# Patient Record
Sex: MALE | Employment: FULL TIME | URBAN - METROPOLITAN AREA
[De-identification: names, ages, dates, MRNs, and addresses within clinical notes are randomized per-mention and may not be internally consistent; named-entity substitution may affect disease eponyms.]

---

## 2017-03-03 ENCOUNTER — APPOINTMENT (OUTPATIENT)
Dept: CT IMAGING | Facility: HOSPITAL | Age: 22
End: 2017-03-03
Attending: EMERGENCY MEDICINE
Payer: COMMERCIAL

## 2017-03-03 ENCOUNTER — HOSPITAL ENCOUNTER (EMERGENCY)
Facility: HOSPITAL | Age: 22
Discharge: HOME | End: 2017-03-04
Attending: EMERGENCY MEDICINE
Payer: COMMERCIAL

## 2017-03-03 DIAGNOSIS — D72.820 LYMPHOCYTOSIS: Primary | ICD-10-CM

## 2017-03-03 LAB
ALBUMIN SERPL-MCNC: 3.9 G/DL (ref 3.5–4.8)
ALP LIVER SERPL-CCNC: 114 U/L (ref 45–117)
ALT SERPL-CCNC: 144 U/L (ref 17–63)
APTT PPP: 38.1 SECONDS (ref 25–34)
AST SERPL-CCNC: 81 U/L (ref 15–41)
BASOPHILS # BLD AUTO: 0.21 X10(3) UL (ref 0–0.1)
BASOPHILS NFR BLD AUTO: 0.6 %
BILIRUB SERPL-MCNC: 0.3 MG/DL (ref 0.1–2)
BUN BLD-MCNC: 19 MG/DL (ref 8–20)
CALCIUM BLD-MCNC: 9 MG/DL (ref 8.3–10.3)
CHLORIDE: 107 MMOL/L (ref 101–111)
CO2: 28 MMOL/L (ref 22–32)
CREAT BLD-MCNC: 1.03 MG/DL (ref 0.7–1.3)
EOSINOPHIL # BLD AUTO: 0.05 X10(3) UL (ref 0–0.3)
EOSINOPHIL NFR BLD AUTO: 0.1 %
ERYTHROCYTE [DISTWIDTH] IN BLOOD BY AUTOMATED COUNT: 13.4 % (ref 11.5–16)
GLUCOSE BLD-MCNC: 99 MG/DL (ref 70–99)
HCT VFR BLD AUTO: 46.5 % (ref 37–53)
HGB BLD-MCNC: 15.6 G/DL (ref 13–17)
IMMATURE GRANULOCYTE COUNT: 0.21 X10(3) UL (ref 0–1)
IMMATURE GRANULOCYTE RATIO %: 0.6 %
INR BLD: 1.16 (ref 0.89–1.11)
LYMPHOCYTES # BLD AUTO: 31.88 X10(3) UL (ref 0.9–4)
LYMPHOCYTES NFR BLD AUTO: 89 %
M PROTEIN MFR SERPL ELPH: 7.8 G/DL (ref 6.1–8.3)
MCH RBC QN AUTO: 29.1 PG (ref 27–33.2)
MCHC RBC AUTO-ENTMCNC: 33.5 G/DL (ref 31–37)
MCV RBC AUTO: 86.6 FL (ref 80–99)
MONOCYTES # BLD AUTO: 1.55 X10(3) UL (ref 0.1–0.6)
MONOCYTES NFR BLD AUTO: 4.3 %
MONOSCREEN: NEGATIVE
MORPHOLOGY: NORMAL
NEUTROPHIL ABS PRELIM: 1.93 X10 (3) UL (ref 1.3–6.7)
NEUTROPHILS # BLD AUTO: 1.93 X10(3) UL (ref 1.3–6.7)
NEUTROPHILS NFR BLD AUTO: 5.4 %
PLATELET # BLD AUTO: 221 10(3)UL (ref 150–450)
PLATELET MORPHOLOGY: NORMAL
POTASSIUM SERPL-SCNC: 4.4 MMOL/L (ref 3.6–5.1)
PSA SERPL DL<=0.01 NG/ML-MCNC: 14.9 SECONDS (ref 12–14.3)
RBC # BLD AUTO: 5.37 X10(6)UL (ref 4.3–5.7)
RED CELL DISTRIBUTION WIDTH-SD: 42.2 FL (ref 35.1–46.3)
SODIUM SERPL-SCNC: 142 MMOL/L (ref 136–144)
TSI SER-ACNC: 3.56 MIU/ML (ref 0.35–5.5)
WBC # BLD AUTO: 35.8 X10(3) UL (ref 4–13)

## 2017-03-03 PROCEDURE — 36415 COLL VENOUS BLD VENIPUNCTURE: CPT

## 2017-03-03 PROCEDURE — 85025 COMPLETE CBC W/AUTO DIFF WBC: CPT | Performed by: EMERGENCY MEDICINE

## 2017-03-03 PROCEDURE — 70450 CT HEAD/BRAIN W/O DYE: CPT

## 2017-03-03 PROCEDURE — 85610 PROTHROMBIN TIME: CPT | Performed by: EMERGENCY MEDICINE

## 2017-03-03 PROCEDURE — 87040 BLOOD CULTURE FOR BACTERIA: CPT | Performed by: EMERGENCY MEDICINE

## 2017-03-03 PROCEDURE — 80053 COMPREHEN METABOLIC PANEL: CPT | Performed by: EMERGENCY MEDICINE

## 2017-03-03 PROCEDURE — 99285 EMERGENCY DEPT VISIT HI MDM: CPT

## 2017-03-03 PROCEDURE — 85730 THROMBOPLASTIN TIME PARTIAL: CPT | Performed by: EMERGENCY MEDICINE

## 2017-03-03 PROCEDURE — 86403 PARTICLE AGGLUT ANTBDY SCRN: CPT | Performed by: EMERGENCY MEDICINE

## 2017-03-03 PROCEDURE — 93005 ELECTROCARDIOGRAM TRACING: CPT

## 2017-03-03 PROCEDURE — 93010 ELECTROCARDIOGRAM REPORT: CPT

## 2017-03-03 PROCEDURE — 84443 ASSAY THYROID STIM HORMONE: CPT | Performed by: EMERGENCY MEDICINE

## 2017-03-04 VITALS
HEART RATE: 86 BPM | HEIGHT: 67 IN | DIASTOLIC BLOOD PRESSURE: 59 MMHG | TEMPERATURE: 99 F | WEIGHT: 125 LBS | SYSTOLIC BLOOD PRESSURE: 106 MMHG | BODY MASS INDEX: 19.62 KG/M2 | RESPIRATION RATE: 21 BRPM | OXYGEN SATURATION: 97 %

## 2017-03-04 LAB
ATRIAL RATE: 78 BPM
P AXIS: -15 DEGREES
P-R INTERVAL: 130 MS
Q-T INTERVAL: 346 MS
QRS DURATION: 76 MS
QTC CALCULATION (BEZET): 394 MS
R AXIS: 83 DEGREES
T AXIS: 15 DEGREES
VENTRICULAR RATE: 78 BPM

## 2017-03-04 NOTE — ED INITIAL ASSESSMENT (HPI)
Migraine about a week ago, swollen eyes starting on Tuesday, lymph node swelling starting today. He has been more tired lately and has had a loss of appetite.

## 2017-03-04 NOTE — ED PROVIDER NOTES
Patient Seen in: [facility] Emergency Department    History   Patient presents with:  Abnormal Result (metabolic, cardiac)    Stated Complaint: sore thoat abnormal ekg     HPI    The patient is a 21-year-old previously healthy male other than childh above.    PSFH elements reviewed from today and agreed except as otherwise stated in HPI.     Physical Exam       ED Triage Vitals   BP 03/03/17 2217 128/85 mmHg   Pulse 03/03/17 2217 97   Resp 03/03/17 2217 18   Temp 03/03/17 2217 99.3 °F (37.4 °C)   Temp scrotum. No epididymal tenderness. Extremities: No deformity, nontender throughout, and normal active range of motion of all 4 extremities. Distal pulses normal and symmetric  Skin: No masses or nodules or abnormalities.   Psych: Normal interaction, coop EKG    Rate, intervals and axes as noted on EKG Report. Rate: 78  Rhythm: Sinus Rhythm  Reading: Normal sinus rhythm with sinus arrhythmia, no other ectopy, normal axis, normal intervals, no ST segment changes or T-wave inversions. This is a normal EKG. Dictated by: Nicole Glass MD on 3/03/2017 at 23:31     Approved by: Nicole Glass MD                The patient does have a WBC of almost 36, predominantly lymphocytes. He does have significant lymphadenopathy. Otherwise his exam is very benign.

## 2017-03-08 ENCOUNTER — OFFICE VISIT (OUTPATIENT)
Dept: FAMILY MEDICINE CLINIC | Facility: CLINIC | Age: 22
End: 2017-03-08

## 2017-03-08 VITALS
RESPIRATION RATE: 12 BRPM | WEIGHT: 126.81 LBS | DIASTOLIC BLOOD PRESSURE: 62 MMHG | BODY MASS INDEX: 20.87 KG/M2 | HEIGHT: 65.5 IN | HEART RATE: 72 BPM | TEMPERATURE: 98 F | SYSTOLIC BLOOD PRESSURE: 104 MMHG

## 2017-03-08 DIAGNOSIS — D72.820 LYMPHOCYTOSIS: Primary | ICD-10-CM

## 2017-03-08 DIAGNOSIS — R59.0 LYMPHADENOPATHY, POSTERIOR CERVICAL: ICD-10-CM

## 2017-03-08 PROCEDURE — 99204 OFFICE O/P NEW MOD 45 MIN: CPT | Performed by: PHYSICIAN ASSISTANT

## 2017-03-08 NOTE — PROGRESS NOTES
CC:  Patient presents with:  Establish Care: New patient   ER F/U      HPI: Ignacio presents for an ER F/U. He was seen 5 days ago in the IC for fatigue and left posterior cervical lymphadenopathy. Inguinal lymph node enlargement was found on exam as well.  C reviewed.     Constitutional: Vital signs reviewed as noted; well developed, well nourished; in no acute distress  HENT:  Head: Normocephalic, atraumatic   Ears: Canals patent and free from edema, erythema or otorrhea; TMs clear; landmarks visible   Nose: N from the treatments as a result of today. Reviewed Past Medical History and   There is no problem list on file for this patient.         Orders Placed This Encounter  CBC W Differential W Platelet [E]  Standing Status: Future  Standing Expiration Date: 3

## 2017-03-08 NOTE — PATIENT INSTRUCTIONS
Lymphadenopathy  Lymphadenopathy is swelling of the lymph nodes. Lymph nodes are small, bean-shaped glands around the body. What are lymph nodes? Lymph nodes are part of your immune system.  The glands are found in your neck, armpits, groin, chest, and You may also have symptoms from an infection causing the swollen glands. These symptoms may include fever, sore throat, body aches, or cough. Diagnosing lymphadenopathy  Your health care provider will ask about your health history and symptoms.  He or she © 6571-4249 [address], [address] [place]. All rights reserved. This information is not intended as a substitute for professional medical care. Always follow your healthcare professional's instructions.

## 2017-03-10 ENCOUNTER — LAB ENCOUNTER (OUTPATIENT)
Dept: LAB | Age: 22
End: 2017-03-10
Attending: FAMILY MEDICINE
Payer: COMMERCIAL

## 2017-03-10 DIAGNOSIS — D72.820 ELEVATED LYMPHOCYTE COUNT: ICD-10-CM

## 2017-03-10 DIAGNOSIS — D72.829 LEUKOCYTOSIS, UNSPECIFIED TYPE: Primary | ICD-10-CM

## 2017-03-10 DIAGNOSIS — R59.0 LYMPHADENOPATHY, POSTERIOR CERVICAL: ICD-10-CM

## 2017-03-10 DIAGNOSIS — D72.820 LYMPHOCYTOSIS: ICD-10-CM

## 2017-03-10 LAB
BASOPHILS # BLD AUTO: 0.08 X10(3) UL (ref 0–0.1)
BASOPHILS NFR BLD AUTO: 0.6 %
EOSINOPHIL # BLD AUTO: 0.01 X10(3) UL (ref 0–0.3)
EOSINOPHIL NFR BLD AUTO: 0.1 %
ERYTHROCYTE [DISTWIDTH] IN BLOOD BY AUTOMATED COUNT: 13.5 % (ref 11.5–16)
HCT VFR BLD AUTO: 44.7 % (ref 37–53)
HGB BLD-MCNC: 14.3 G/DL (ref 13–17)
IMMATURE GRANULOCYTE COUNT: 0.03 X10(3) UL (ref 0–1)
IMMATURE GRANULOCYTE RATIO %: 0.2 %
LYMPHOCYTES # BLD AUTO: 11.48 X10(3) UL (ref 0.9–4)
LYMPHOCYTES NFR BLD AUTO: 80.4 %
MCH RBC QN AUTO: 28.2 PG (ref 27–33.2)
MCHC RBC AUTO-ENTMCNC: 32 G/DL (ref 31–37)
MCV RBC AUTO: 88.2 FL (ref 80–99)
MONOCYTES # BLD AUTO: 0.56 X10(3) UL (ref 0.1–0.6)
MONOCYTES NFR BLD AUTO: 3.9 %
NEUTROPHIL ABS PRELIM: 2.11 X10 (3) UL (ref 1.3–6.7)
NEUTROPHILS # BLD AUTO: 2.11 X10(3) UL (ref 1.3–6.7)
NEUTROPHILS NFR BLD AUTO: 14.8 %
PLATELET # BLD AUTO: 154 10(3)UL (ref 150–450)
RBC # BLD AUTO: 5.07 X10(6)UL (ref 4.3–5.7)
RED CELL DISTRIBUTION WIDTH-SD: 43.2 FL (ref 35.1–46.3)
WBC # BLD AUTO: 14.3 X10(3) UL (ref 4–13)

## 2017-03-10 PROCEDURE — 85025 COMPLETE CBC W/AUTO DIFF WBC: CPT

## 2017-03-10 PROCEDURE — 36415 COLL VENOUS BLD VENIPUNCTURE: CPT

## 2017-03-18 ENCOUNTER — LAB ENCOUNTER (OUTPATIENT)
Dept: LAB | Age: 22
End: 2017-03-18
Attending: FAMILY MEDICINE
Payer: COMMERCIAL

## 2017-03-18 DIAGNOSIS — D72.829 LEUKOCYTOSIS, UNSPECIFIED TYPE: ICD-10-CM

## 2017-03-18 DIAGNOSIS — D72.820 ELEVATED LYMPHOCYTE COUNT: ICD-10-CM

## 2017-03-18 LAB
BASOPHILS # BLD AUTO: 0.03 X10(3) UL (ref 0–0.1)
BASOPHILS NFR BLD AUTO: 0.4 %
EOSINOPHIL # BLD AUTO: 0.14 X10(3) UL (ref 0–0.3)
EOSINOPHIL NFR BLD AUTO: 1.7 %
ERYTHROCYTE [DISTWIDTH] IN BLOOD BY AUTOMATED COUNT: 13.2 % (ref 11.5–16)
HCT VFR BLD AUTO: 45.3 % (ref 37–53)
HGB BLD-MCNC: 14.4 G/DL (ref 13–17)
IMMATURE GRANULOCYTE COUNT: 0.01 X10(3) UL (ref 0–1)
IMMATURE GRANULOCYTE RATIO %: 0.1 %
LYMPHOCYTES # BLD AUTO: 6.31 X10(3) UL (ref 0.9–4)
LYMPHOCYTES NFR BLD AUTO: 76.8 %
MCH RBC QN AUTO: 28.1 PG (ref 27–33.2)
MCHC RBC AUTO-ENTMCNC: 31.8 G/DL (ref 31–37)
MCV RBC AUTO: 88.5 FL (ref 80–99)
MONOCYTES # BLD AUTO: 0.64 X10(3) UL (ref 0.1–0.6)
MONOCYTES NFR BLD AUTO: 7.8 %
NEUTROPHIL ABS PRELIM: 1.09 X10 (3) UL (ref 1.3–6.7)
NEUTROPHILS # BLD AUTO: 1.09 X10(3) UL (ref 1.3–6.7)
NEUTROPHILS NFR BLD AUTO: 13.2 %
PLATELET # BLD AUTO: 192 10(3)UL (ref 150–450)
RBC # BLD AUTO: 5.12 X10(6)UL (ref 4.3–5.7)
RED CELL DISTRIBUTION WIDTH-SD: 43 FL (ref 35.1–46.3)
WBC # BLD AUTO: 8.2 X10(3) UL (ref 4–13)

## 2017-03-18 PROCEDURE — 36415 COLL VENOUS BLD VENIPUNCTURE: CPT

## 2017-03-18 PROCEDURE — 85025 COMPLETE CBC W/AUTO DIFF WBC: CPT

## 2017-03-20 ENCOUNTER — TELEPHONE (OUTPATIENT)
Dept: FAMILY MEDICINE CLINIC | Facility: CLINIC | Age: 22
End: 2017-03-20

## 2017-03-20 DIAGNOSIS — R59.1 LYMPHADENOPATHY: ICD-10-CM

## 2017-03-20 DIAGNOSIS — D72.820 LYMPHOCYTOSIS: Primary | ICD-10-CM

## 2017-05-01 ENCOUNTER — LAB ENCOUNTER (OUTPATIENT)
Dept: LAB | Age: 22
End: 2017-05-01
Attending: FAMILY MEDICINE
Payer: COMMERCIAL

## 2017-05-01 DIAGNOSIS — R59.1 LYMPHADENOPATHY: ICD-10-CM

## 2017-05-01 DIAGNOSIS — D72.820 LYMPHOCYTOSIS: ICD-10-CM

## 2017-05-01 PROCEDURE — 36415 COLL VENOUS BLD VENIPUNCTURE: CPT | Performed by: PHYSICIAN ASSISTANT

## 2017-05-01 PROCEDURE — 85025 COMPLETE CBC W/AUTO DIFF WBC: CPT | Performed by: PHYSICIAN ASSISTANT

## 2017-05-08 ENCOUNTER — APPOINTMENT (OUTPATIENT)
Dept: LAB | Age: 22
End: 2017-05-08
Attending: FAMILY MEDICINE
Payer: COMMERCIAL

## 2017-05-08 ENCOUNTER — OFFICE VISIT (OUTPATIENT)
Dept: FAMILY MEDICINE CLINIC | Facility: CLINIC | Age: 22
End: 2017-05-08

## 2017-05-08 VITALS
SYSTOLIC BLOOD PRESSURE: 118 MMHG | BODY MASS INDEX: 21.73 KG/M2 | WEIGHT: 132 LBS | TEMPERATURE: 98 F | RESPIRATION RATE: 16 BRPM | HEIGHT: 65.5 IN | HEART RATE: 84 BPM | DIASTOLIC BLOOD PRESSURE: 68 MMHG

## 2017-05-08 DIAGNOSIS — R74.8 ELEVATED LIVER ENZYMES: ICD-10-CM

## 2017-05-08 DIAGNOSIS — Z00.00 ROUTINE GENERAL MEDICAL EXAMINATION AT A HEALTH CARE FACILITY: Primary | ICD-10-CM

## 2017-05-08 DIAGNOSIS — R59.0 ENLARGED LYMPH NODE IN NECK: ICD-10-CM

## 2017-05-08 PROCEDURE — 99395 PREV VISIT EST AGE 18-39: CPT | Performed by: FAMILY MEDICINE

## 2017-05-08 PROCEDURE — 80076 HEPATIC FUNCTION PANEL: CPT | Performed by: FAMILY MEDICINE

## 2017-05-08 PROCEDURE — 36415 COLL VENOUS BLD VENIPUNCTURE: CPT | Performed by: FAMILY MEDICINE

## 2017-05-08 NOTE — PROGRESS NOTES
Eder Diaz is a 22year old male who presents for a complete physical exam.   HPI:   Asthma as a child, not now. Zyrtec prn. 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 About 2 months ago, sister noticed lump on back on neck.   Went to ER - dx with wolfgang bruits  LUNGS: clear to auscultation  CARDIO: RRR without murmur  GI: good BS's,no masses, HSM or tenderness  : /  EXTREMITIES: no cyanosis, clubbing or edema  NEURO: Oriented times three,cranial nerves are intact,motor and sensory are grossly intact  LY

## 2019-09-25 ENCOUNTER — TELEPHONE (OUTPATIENT)
Dept: FAMILY MEDICINE CLINIC | Facility: CLINIC | Age: 24
End: 2019-09-25

## 2019-09-25 DIAGNOSIS — Z00.00 ROUTINE GENERAL MEDICAL EXAMINATION AT A HEALTH CARE FACILITY: Primary | ICD-10-CM

## 2019-09-25 NOTE — TELEPHONE ENCOUNTER
Please enter lab orders for the patient's upcoming physical appointment. Physical scheduled:    Your appointments     Date & Time Appointment Department [facility])    Oct 26, 2019  9:00 AM CDT Physical - Established Patient with MD Alon Siu

## 2019-10-26 ENCOUNTER — OFFICE VISIT (OUTPATIENT)
Dept: FAMILY MEDICINE CLINIC | Facility: CLINIC | Age: 24
End: 2019-10-26
Payer: COMMERCIAL

## 2019-10-26 VITALS
BODY MASS INDEX: 21.34 KG/M2 | SYSTOLIC BLOOD PRESSURE: 106 MMHG | TEMPERATURE: 99 F | HEIGHT: 65.75 IN | HEART RATE: 72 BPM | WEIGHT: 131.19 LBS | DIASTOLIC BLOOD PRESSURE: 66 MMHG | RESPIRATION RATE: 17 BRPM

## 2019-10-26 DIAGNOSIS — Z00.00 ROUTINE GENERAL MEDICAL EXAMINATION AT A HEALTH CARE FACILITY: Primary | ICD-10-CM

## 2019-10-26 PROCEDURE — 99395 PREV VISIT EST AGE 18-39: CPT | Performed by: FAMILY MEDICINE

## 2019-10-26 PROCEDURE — 90471 IMMUNIZATION ADMIN: CPT | Performed by: FAMILY MEDICINE

## 2019-10-26 PROCEDURE — 90686 IIV4 VACC NO PRSV 0.5 ML IM: CPT | Performed by: FAMILY MEDICINE

## 2019-10-26 NOTE — PROGRESS NOTES
Yamile Castrejon is a 22year old male who presents for a complete physical exam.   HPI:   Healthy, no concerns today. [employer] in [facility]. Living in [facility]. fhx unremarkable.     Last colonoscopy:  n/a  Last PSA:  n/a    Wt Readings from Last 6 edema  NEURO: Oriented times three,cranial nerves are intact,motor and sensory are grossly intact    ASSESSMENT AND PLAN:   Remy Ceja is a 22year old male who presents for a complete physical exam.  Routine general medical examination at a [facility]

## 2020-10-13 ENCOUNTER — OFFICE VISIT (OUTPATIENT)
Dept: FAMILY MEDICINE CLINIC | Facility: CLINIC | Age: 25
End: 2020-10-13
Payer: COMMERCIAL

## 2020-10-13 VITALS
HEIGHT: 65.5 IN | WEIGHT: 132.63 LBS | TEMPERATURE: 98 F | DIASTOLIC BLOOD PRESSURE: 64 MMHG | SYSTOLIC BLOOD PRESSURE: 110 MMHG | BODY MASS INDEX: 21.83 KG/M2 | RESPIRATION RATE: 16 BRPM | HEART RATE: 64 BPM

## 2020-10-13 DIAGNOSIS — Z00.00 ROUTINE MEDICAL EXAM: Primary | ICD-10-CM

## 2020-10-13 DIAGNOSIS — Z00.00 LABORATORY EXAMINATION ORDERED AS PART OF A COMPLETE PHYSICAL EXAMINATION: ICD-10-CM

## 2020-10-13 DIAGNOSIS — Z23 NEEDS FLU SHOT: ICD-10-CM

## 2020-10-13 PROCEDURE — 3008F BODY MASS INDEX DOCD: CPT | Performed by: PHYSICIAN ASSISTANT

## 2020-10-13 PROCEDURE — 3074F SYST BP LT 130 MM HG: CPT | Performed by: PHYSICIAN ASSISTANT

## 2020-10-13 PROCEDURE — 99395 PREV VISIT EST AGE 18-39: CPT | Performed by: PHYSICIAN ASSISTANT

## 2020-10-13 PROCEDURE — 3078F DIAST BP <80 MM HG: CPT | Performed by: PHYSICIAN ASSISTANT

## 2020-10-13 PROCEDURE — 90686 IIV4 VACC NO PRSV 0.5 ML IM: CPT | Performed by: PHYSICIAN ASSISTANT

## 2020-10-13 PROCEDURE — 90471 IMMUNIZATION ADMIN: CPT | Performed by: PHYSICIAN ASSISTANT

## 2020-10-15 NOTE — PROGRESS NOTES
DATE OF EXAM  10/13/2020    CHIEF COMPLAINT/REASON FOR VISIT  Annual exam.    HISTORY OF PRESENT ILLNESS  Igancio Valencia presents today for routine annual physical examination. He has been doing well. Requests flu shot. No concerns today.        No current stools or blood in the stool. : No increased frequency or urgency. No hematuria. PSYCH: No anxiety or depression. No suicidal ideation. SKIN: No rashes or lesions. NEURO: Denies numbness or tingling of extremities. No headaches. No recent falls. as shown above. Recommend annual physical exams. 2. Laboratory examination ordered as part of a complete physical examination  - LIPID PANEL; Future  - COMP METABOLIC PANEL (14); Future  - TSH W REFLEX TO FREE T4; Future    3.  Needs flu shot  Flu shot g

## 2021-10-18 ENCOUNTER — TELEPHONE (OUTPATIENT)
Dept: FAMILY MEDICINE CLINIC | Facility: CLINIC | Age: 26
End: 2021-10-18

## 2021-10-18 DIAGNOSIS — Z00.00 LABORATORY EXAMINATION ORDERED AS PART OF A COMPLETE PHYSICAL EXAMINATION: Primary | ICD-10-CM

## 2021-10-18 NOTE — TELEPHONE ENCOUNTER
Please enter lab orders for the patient's upcoming physical appointment. Physical scheduled: Your appointments     Date & Time Appointment Department [facility])    Oct 28, 2021  9:00 AM CDT Physical - Established with VASHTI Warner [address]  ([address])            [address],  [address]  [facility] [phone]         Preferred lab: [facility])     The patient has been notified to complete fasting labs prior to their physical appointment.

## 2021-10-26 ENCOUNTER — LAB ENCOUNTER (OUTPATIENT)
Dept: LAB | Age: 26
End: 2021-10-26
Attending: PHYSICIAN ASSISTANT
Payer: COMMERCIAL

## 2021-10-26 DIAGNOSIS — Z00.00 LABORATORY EXAMINATION ORDERED AS PART OF A COMPLETE PHYSICAL EXAMINATION: ICD-10-CM

## 2021-10-26 PROCEDURE — 80053 COMPREHEN METABOLIC PANEL: CPT

## 2021-10-26 PROCEDURE — 85025 COMPLETE CBC W/AUTO DIFF WBC: CPT

## 2021-10-26 PROCEDURE — 80061 LIPID PANEL: CPT

## 2021-10-26 PROCEDURE — 36415 COLL VENOUS BLD VENIPUNCTURE: CPT

## 2021-10-26 PROCEDURE — 84443 ASSAY THYROID STIM HORMONE: CPT

## 2021-10-28 ENCOUNTER — OFFICE VISIT (OUTPATIENT)
Dept: FAMILY MEDICINE CLINIC | Facility: CLINIC | Age: 26
End: 2021-10-28
Payer: COMMERCIAL

## 2021-10-28 VITALS
HEIGHT: 65.7 IN | TEMPERATURE: 98 F | BODY MASS INDEX: 22.28 KG/M2 | HEART RATE: 90 BPM | SYSTOLIC BLOOD PRESSURE: 102 MMHG | WEIGHT: 137 LBS | RESPIRATION RATE: 15 BRPM | DIASTOLIC BLOOD PRESSURE: 64 MMHG | OXYGEN SATURATION: 100 %

## 2021-10-28 DIAGNOSIS — Z00.00 WELL ADULT EXAM: Primary | ICD-10-CM

## 2021-10-28 DIAGNOSIS — Z23 NEEDS FLU SHOT: ICD-10-CM

## 2021-10-28 DIAGNOSIS — Z00.00 LABORATORY EXAMINATION ORDERED AS PART OF A COMPLETE PHYSICAL EXAMINATION: ICD-10-CM

## 2021-10-28 PROCEDURE — 90471 IMMUNIZATION ADMIN: CPT | Performed by: PHYSICIAN ASSISTANT

## 2021-10-28 PROCEDURE — 3074F SYST BP LT 130 MM HG: CPT | Performed by: PHYSICIAN ASSISTANT

## 2021-10-28 PROCEDURE — 3078F DIAST BP <80 MM HG: CPT | Performed by: PHYSICIAN ASSISTANT

## 2021-10-28 PROCEDURE — 90686 IIV4 VACC NO PRSV 0.5 ML IM: CPT | Performed by: PHYSICIAN ASSISTANT

## 2021-10-28 PROCEDURE — 99395 PREV VISIT EST AGE 18-39: CPT | Performed by: PHYSICIAN ASSISTANT

## 2021-10-28 PROCEDURE — 3008F BODY MASS INDEX DOCD: CPT | Performed by: PHYSICIAN ASSISTANT

## 2022-02-11 ENCOUNTER — OFFICE VISIT (OUTPATIENT)
Dept: FAMILY MEDICINE CLINIC | Facility: CLINIC | Age: 27
End: 2022-02-11
Payer: COMMERCIAL

## 2022-02-11 VITALS
RESPIRATION RATE: 16 BRPM | HEART RATE: 68 BPM | BODY MASS INDEX: 22.66 KG/M2 | WEIGHT: 136 LBS | TEMPERATURE: 98 F | SYSTOLIC BLOOD PRESSURE: 120 MMHG | HEIGHT: 65 IN | DIASTOLIC BLOOD PRESSURE: 64 MMHG

## 2022-02-11 DIAGNOSIS — L29.9 PRURITUS: Primary | ICD-10-CM

## 2022-02-11 DIAGNOSIS — L50.9 HIVES: ICD-10-CM

## 2022-02-11 PROCEDURE — 3074F SYST BP LT 130 MM HG: CPT | Performed by: NURSE PRACTITIONER

## 2022-02-11 PROCEDURE — 3008F BODY MASS INDEX DOCD: CPT | Performed by: NURSE PRACTITIONER

## 2022-02-11 PROCEDURE — 3078F DIAST BP <80 MM HG: CPT | Performed by: NURSE PRACTITIONER

## 2022-02-11 PROCEDURE — 99213 OFFICE O/P EST LOW 20 MIN: CPT | Performed by: NURSE PRACTITIONER

## 2022-02-11 RX ORDER — LORATADINE 10 MG/1
10 TABLET ORAL DAILY
Qty: 90 TABLET | Refills: 0 | Status: SHIPPED | OUTPATIENT
Start: 2022-02-11

## 2023-06-12 ENCOUNTER — TELEPHONE (OUTPATIENT)
Dept: FAMILY MEDICINE CLINIC | Facility: CLINIC | Age: 28
End: 2023-06-12

## 2023-06-12 DIAGNOSIS — Z13.6 SCREENING FOR CARDIOVASCULAR CONDITION: Primary | ICD-10-CM

## 2023-06-12 NOTE — TELEPHONE ENCOUNTER
Please enter lab orders for the patient's upcoming physical appointment. Physical scheduled: Your appointments     Date & Time Appointment Department [facility])    Jul 14, 2023 10:30 AM CDT Physical - Established with Asia Blanc MD [address], [address], [place] ([facility])            [address] [place] [id number] [place] [address] [phone]         Preferred lab: [facility]     The patient has been notified to complete fasting labs prior to their physical appointment.

## 2023-06-28 LAB
AMB EXT BUN: 30 MG/DL
AMB EXT CREATININE: 27 MG/DL
AMB EXT LDL CHOLESTEROL, DIRECT: 105 MG/DL

## 2023-07-14 ENCOUNTER — OFFICE VISIT (OUTPATIENT)
Dept: FAMILY MEDICINE CLINIC | Facility: CLINIC | Age: 28
End: 2023-07-14
Payer: COMMERCIAL

## 2023-07-14 VITALS
HEART RATE: 56 BPM | DIASTOLIC BLOOD PRESSURE: 60 MMHG | WEIGHT: 137 LBS | HEIGHT: 65 IN | TEMPERATURE: 97 F | OXYGEN SATURATION: 97 % | BODY MASS INDEX: 22.82 KG/M2 | SYSTOLIC BLOOD PRESSURE: 100 MMHG

## 2023-07-14 DIAGNOSIS — Z00.00 ENCOUNTER FOR ROUTINE HISTORY AND PHYSICAL EXAMINATION: Primary | ICD-10-CM

## 2023-07-14 DIAGNOSIS — Z23 NEED FOR VACCINATION: ICD-10-CM

## 2023-07-14 PROCEDURE — 90715 TDAP VACCINE 7 YRS/> IM: CPT | Performed by: STUDENT IN AN ORGANIZED HEALTH CARE EDUCATION/TRAINING PROGRAM

## 2023-07-14 PROCEDURE — 90471 IMMUNIZATION ADMIN: CPT | Performed by: STUDENT IN AN ORGANIZED HEALTH CARE EDUCATION/TRAINING PROGRAM

## 2023-07-14 PROCEDURE — 3078F DIAST BP <80 MM HG: CPT | Performed by: STUDENT IN AN ORGANIZED HEALTH CARE EDUCATION/TRAINING PROGRAM

## 2023-07-14 PROCEDURE — 3008F BODY MASS INDEX DOCD: CPT | Performed by: STUDENT IN AN ORGANIZED HEALTH CARE EDUCATION/TRAINING PROGRAM

## 2023-07-14 PROCEDURE — 99395 PREV VISIT EST AGE 18-39: CPT | Performed by: STUDENT IN AN ORGANIZED HEALTH CARE EDUCATION/TRAINING PROGRAM

## 2023-07-14 PROCEDURE — 3074F SYST BP LT 130 MM HG: CPT | Performed by: STUDENT IN AN ORGANIZED HEALTH CARE EDUCATION/TRAINING PROGRAM

## 2023-07-14 PROCEDURE — 90472 IMMUNIZATION ADMIN EACH ADD: CPT | Performed by: STUDENT IN AN ORGANIZED HEALTH CARE EDUCATION/TRAINING PROGRAM

## 2023-08-07 NOTE — PROGRESS NOTES
DATE OF EXAM  10/28/21    CHIEF COMPLAINT/REASON FOR VISIT  Annual exam.    HISTORY OF PRESENT ILLNESS  Ignacio Robert presents today for routine annual physical examination. He has been doing well. Requests flu shot. No concerns today.        No current ou OBPostPartum Assessment Completed on: 07-Aug-2023 13:12 pain.  GI: No nausea, vomiting, diarrhea, or constipation. No change in bowel movements. No black or tarry stools or blood in the stool. : No increased frequency or urgency. No hematuria. PSYCH: No anxiety or depression. No suicidal ideation.   SKIN: sunscreen, monitoring moles, testicular self-exams, safety. Health maintenance is up to date today as shown above. Recommend annual physical exams. 2. Laboratory examination ordered as part of a complete physical examination  - LIPID PANEL;  Future  - CO

## (undated) NOTE — ED AVS SNAPSHOT
[facility] Emergency Department    [facility]  [address] [address]    Phone:  [phone]    Fax:  Sarwat Voss   MRN: [id number]    Department:  [facility] Emergency Department   Date of Visit:  3/3/2017 If you have any problems with your follow-up, please call our  at [phone]    Si usted tiene algun problema con conklin sequimiento, por favor llame a nuestro adminstrador de casos al [phone]    Expect to receive an electronic request Mini Shay [address] [facility] ([address]) [address] [facility] [address] ([address] [facility] [address]) [address]  ([address]) [address] [facility] Approved by: Elyssa Francois MD              Narrative:    PROCEDURE:  CT BRAIN OR HEAD ([id number])     COMPARISON:  None.      INDICATIONS:  Headache one week ago, now resolved but significant diffuse lymphadenopathy, WBC 36     TECHNIQUE:  Noncontrast CT sca

## (undated) NOTE — MR AVS SNAPSHOT
[address] [facility] [address]  [facility],  [address]  [address] [facility] [address] [address]               Thank you for choosing us for your health care visit with Gabriella Lawson MD.  We are glad to serve you and happy to provide you with this conklin

## (undated) NOTE — MR AVS SNAPSHOT
[address] [facility] [address]  [facility],  [address]  [address] [place] [address] [phone]               Thank you for choosing us for your health care visit with Joo Waldrop PA-C.   We are glad to serve you and happy to provide you with [facility] throat. The nodes may swell in just one area of the body, such as the neck (localized). Or nodes may swell all over the body (generalized). The neck (cervical) lymph nodes are the most common site of lymphadenopathy. What causes lymphadenopathy?   Dead mike · Urine test. This is also done to check for infection and other problems. · Chest X-ray. This test can show enlarged lymph nodes or other problems. · Lymph node biopsy. If lymph nodes are swollen for 3 to 4 weeks, they may be checked with a biopsy.  Smal

## (undated) NOTE — ED AVS SNAPSHOT
[facility] Emergency Department    [place]  [address] [place] [address]    Phone:  [phone]    Fax:  Sarwat Voss   MRN: [id number]    Department:  [facility] Emergency Department   Date of Visit:  3/3/2017 IF THERE IS ANY CHANGE OR WORSENING OF YOUR CONDITION, CALL YOUR PRIMARY CARE PHYSICIAN AT ONCE OR RETURN IMMEDIATELY TO THE EMERGENCY DEPARTMENT.     If you have been prescribed any medication(s), please fill your prescription right away and begin taking t